# Patient Record
Sex: FEMALE | Race: WHITE | NOT HISPANIC OR LATINO | URBAN - METROPOLITAN AREA
[De-identification: names, ages, dates, MRNs, and addresses within clinical notes are randomized per-mention and may not be internally consistent; named-entity substitution may affect disease eponyms.]

---

## 2023-06-19 ENCOUNTER — HOSPITAL ENCOUNTER (OUTPATIENT)
Dept: DATA CONVERSION | Facility: HOSPITAL | Age: 68
End: 2023-06-19
Attending: SURGERY | Admitting: SURGERY

## 2023-06-19 DIAGNOSIS — Z88.0 ALLERGY STATUS TO PENICILLIN: ICD-10-CM

## 2023-06-19 DIAGNOSIS — Z87.891 PERSONAL HISTORY OF NICOTINE DEPENDENCE: ICD-10-CM

## 2023-06-19 DIAGNOSIS — E03.9 HYPOTHYROIDISM, UNSPECIFIED: ICD-10-CM

## 2023-06-19 DIAGNOSIS — Z12.11 ENCOUNTER FOR SCREENING FOR MALIGNANT NEOPLASM OF COLON: ICD-10-CM

## 2023-06-19 DIAGNOSIS — I10 ESSENTIAL (PRIMARY) HYPERTENSION: ICD-10-CM

## 2023-06-19 DIAGNOSIS — K21.9 GASTRO-ESOPHAGEAL REFLUX DISEASE WITHOUT ESOPHAGITIS: ICD-10-CM

## 2023-06-19 DIAGNOSIS — E66.9 OBESITY, UNSPECIFIED: ICD-10-CM

## 2023-06-19 DIAGNOSIS — Z87.11 PERSONAL HISTORY OF PEPTIC ULCER DISEASE: ICD-10-CM

## 2023-06-19 DIAGNOSIS — R12 HEARTBURN: ICD-10-CM

## 2023-06-19 DIAGNOSIS — Z85.048 PERSONAL HISTORY OF OTHER MALIGNANT NEOPLASM OF RECTUM, RECTOSIGMOID JUNCTION, AND ANUS: ICD-10-CM

## 2023-06-19 DIAGNOSIS — K58.9 IRRITABLE BOWEL SYNDROME WITHOUT DIARRHEA: ICD-10-CM

## 2023-09-07 VITALS — WEIGHT: 174.16 LBS | HEIGHT: 62 IN | BODY MASS INDEX: 32.05 KG/M2

## 2024-01-16 ENCOUNTER — APPOINTMENT (OUTPATIENT)
Dept: ENDOCRINOLOGY | Facility: CLINIC | Age: 69
End: 2024-01-16
Payer: MEDICARE

## 2024-08-14 ENCOUNTER — APPOINTMENT (OUTPATIENT)
Dept: UROLOGY | Facility: CLINIC | Age: 69
End: 2024-08-14
Payer: MEDICARE

## 2024-08-14 VITALS
HEART RATE: 64 BPM | DIASTOLIC BLOOD PRESSURE: 80 MMHG | HEIGHT: 61 IN | BODY MASS INDEX: 28.51 KG/M2 | WEIGHT: 151 LBS | SYSTOLIC BLOOD PRESSURE: 179 MMHG

## 2024-08-14 DIAGNOSIS — N32.81 OAB (OVERACTIVE BLADDER): ICD-10-CM

## 2024-08-14 DIAGNOSIS — N39.46 MIXED STRESS AND URGE URINARY INCONTINENCE: Primary | ICD-10-CM

## 2024-08-14 LAB
POC BILIRUBIN, URINE: NEGATIVE
POC BLOOD, URINE: ABNORMAL
POC GLUCOSE, URINE: NEGATIVE MG/DL
POC KETONES, URINE: NEGATIVE MG/DL
POC LEUKOCYTES, URINE: NEGATIVE
POC NITRITE,URINE: NEGATIVE
POC PH, URINE: 8 PH
POC PROTEIN, URINE: NEGATIVE MG/DL
POC SPECIFIC GRAVITY, URINE: 1.02
POC UROBILINOGEN, URINE: 0.2 EU/DL

## 2024-08-14 PROCEDURE — 3008F BODY MASS INDEX DOCD: CPT

## 2024-08-14 PROCEDURE — 1159F MED LIST DOCD IN RCRD: CPT

## 2024-08-14 PROCEDURE — 81003 URINALYSIS AUTO W/O SCOPE: CPT

## 2024-08-14 PROCEDURE — 99204 OFFICE O/P NEW MOD 45 MIN: CPT

## 2024-08-14 PROCEDURE — 1160F RVW MEDS BY RX/DR IN RCRD: CPT

## 2024-08-14 PROCEDURE — 51798 US URINE CAPACITY MEASURE: CPT

## 2024-08-14 PROCEDURE — 2000F BLOOD PRESSURE MEASURE: CPT

## 2024-08-14 RX ORDER — SOLIFENACIN SUCCINATE 10 MG/1
10 TABLET, FILM COATED ORAL DAILY
Qty: 30 TABLET | Refills: 3 | Status: SHIPPED | OUTPATIENT
Start: 2024-08-14 | End: 2024-12-12

## 2024-08-14 RX ORDER — ESCITALOPRAM OXALATE 10 MG/1
1 TABLET ORAL
COMMUNITY
Start: 2024-07-20

## 2024-08-14 RX ORDER — LEVOTHYROXINE SODIUM 137 UG/1
TABLET ORAL
COMMUNITY

## 2024-08-14 NOTE — PROGRESS NOTES
[facility]  Outpatient Clinic Note    Patient: Nemo Small  Age/Sex: 68 y.o., female  MRN: [id number]  Referred by: Dr. Lord ref. provider found     Chief Complaint: Urinary incontinence         History of Present Illness  This is a 68 y.o. female,  who presents as a new patient to the clinic for mixed urinary incontinence.  The patient reports that urge urinary incontinence is more bothersome than stress urinary incontinence.. She goes to the bathroom every 15 minutes during the day. She was on Vesicare 10 years ago.  She stated that this helped her symptoms significantly.  She is not sure why she stopped it. She goes to the bathroom 3-4 times a night. She denies dysuria, gross hematuria, flank pain, pelvic pain, vaginal bulging, fever or chills. The patient stated her bowel movements are normal and daily.  She is not sexually active, by choice. Previous abdominal/pelvic surgery a hysterectomy for for endometriosis.  The patient had 2 C-sections.  No history of breast cancer. The patient is a former cigarette smoker for 30 years.  The patient denies a history of glaucoma.        Gyn History:  - Menopausal: No           Postmenopausal bleeding: No  - Hysterectomy: Yes   - Sexually active:  No  Dyspareunia: No   Other issues: none  - Number of prior vaginal deliveries: 0  - Number of prior c-sections: 2      Past Medical & Surgical History  Past Medical History:   Diagnosis Date    Anxiety     Colon cancer (Multi)     Hypertension     Thyroid disease      Past Surgical History:   Procedure Laterality Date    BACK SURGERY       SECTION, CLASSIC      HYSTERECTOMY         Family History  No family history on file.    Social History  She reports that she has quit smoking. Her smoking use included cigarettes. She does not have any smokeless tobacco history on file. She reports that she does not currently use alcohol. She reports that she does not use drugs.    Allergies  Ampicillin and  Cefaclor    Medications:  Current Outpatient Medications on File Prior to Visit   Medication Sig Dispense Refill    escitalopram (Lexapro) 10 mg tablet Take 1 tablet (10 mg) by mouth early in the morning..      levothyroxine (Synthroid, Levoxyl) 137 mcg tablet TAKE 1 TABLET BY MOUTH MONDAY-SATURDAY BEFORE BREAKFAST      LISINOPRIL ORAL Take 40 mg by mouth once daily.       No current facility-administered medications on file prior to visit.      Vitals:    08/14/24 1336   BP: 179/80   Pulse: 64     Body mass index is 28.53 kg/m².    Review of Systems   A comprehensive 10+ review of systems was negative except for: see hpi          Physical Exam                                                                                                                      General: Well developed, well nourished, alert and cooperative, appears in no acute distress  Head: Normocephalic, atraumatic  Neck: supple, trachea midline  Eyes: Non-injected conjunctiva, sclera clear, no proptosis  Cardiac: Extremities are warm and well perfused. No edema, cyanosis or pallor.   Lungs: Breathing is easy, non-labored. Speaking in clear and complete sentences. Normal diaphragmatic movement.  Abdomen: soft, non-distended, non-tender, no rebound or guarding, no hernia and no CVA tenderness   MSK: Ambulatory with steady gait, unassisted  Neuro: alert and oriented to person, place and time  Psych: Demonstrates good judgement and reason, without hallucinations, abnormal affect or abnormal behaviors.  Skin: no obvious lesions, no rashes    PVR (by Ultrasound): 0mL   Urine dip:   Recent Results (from the past 6 hour(s))   POCT UA Automated manually resulted    Collection Time: 08/14/24  1:48 PM   Result Value Ref Range    POC Glucose, Urine NEGATIVE NEGATIVE mg/dl    POC Bilirubin, Urine NEGATIVE NEGATIVE    POC Ketones, Urine NEGATIVE NEGATIVE mg/dl    POC Specific Gravity, Urine 1.020 1.005 - 1.035    POC Blood, Urine TRACE-Intact (A) NEGATIVE     POC PH, Urine 8.0 No Reference Range Established PH    POC Protein, Urine NEGATIVE NEGATIVE, 30 (1+) mg/dl    POC Urobilinogen, Urine 0.2 0.2, 1.0 EU/DL    Poc Nitrite, Urine NEGATIVE NEGATIVE    POC Leukocytes, Urine NEGATIVE NEGATIVE       Labs  N/A    Imaging  N/A      IMPRESSION AND PLAN:  Nemo Small is a 68 y.o. presents with mixed urinary incontinence and overactive bladder    ABDULAZIZ: Urge dominant  -discussed mechanism of UUI and HANNAH, and treatment options for both including PFT, pessary, sling for HANNAH and PFT, pharmacotherapy and third-line therapy for OAB    OAB/UUI  -Start Vesicare once daily in the morning  we discussed botox vs sacral neuromodulation: both have similar efficacy 80% patients reports >50% improvement, botox associated with 5% risk of incomplete emptying, increase in UTI and will require re-injection in 6-9 months; and as early as 3 months. SNM is a staged procedure, 2 weeks apart, consisting first of lead implantation then internalization of IPG if there is improvement. Interstim is associated with lead migration, explantation, infection and bleeding, though risks are all <5%. We also discussed PTNS which is associated with success rates comparable to medical therapy but without side-effects without significant major morbidity.   -Educational handouts given to the patient on third line options    Follow up in 8 weeks.     All questions and concerns were answered and addressed.  The patient expressed understanding and agrees with the plan.     Reviewed and approved by TREVOR NGUYEN on 8/14/24 at 1:48 PM.

## 2024-08-15 ENCOUNTER — TELEPHONE (OUTPATIENT)
Dept: UROLOGY | Facility: CLINIC | Age: 69
End: 2024-08-15
Payer: MEDICARE

## 2024-08-15 NOTE — TELEPHONE ENCOUNTER
Patient picked up her script for Vesicare, she said you wanted to start her on 20mg, but they are only 10mg, do you want to change or have her take 2 per day  Thank you

## 2024-08-22 NOTE — TELEPHONE ENCOUNTER
Yes , I explained to the patient that you only wanted her to take 1 pill once daily at 10mg but she stated she is scared to go down dosage bc the 20mg is working. Do you mind reaching back out to her?

## 2024-08-22 NOTE — TELEPHONE ENCOUNTER
The patient should be taking one pill daily-10mg. I discussed with the patient the prescription I sent in was an increase in her previously dose from 5mg to 10mg. The maximum dose per day is 10mg.

## 2024-08-22 NOTE — TELEPHONE ENCOUNTER
Patient calling in stated she has been taking two pills once daily all last week. She thought you wanted her to take 20mg and she will run out by next week. - she said she was under the impression that she was to take 20mg instead of 10mg.

## 2024-10-15 ENCOUNTER — APPOINTMENT (OUTPATIENT)
Dept: UROLOGY | Facility: CLINIC | Age: 69
End: 2024-10-15
Payer: MEDICARE

## 2024-10-15 VITALS — BODY MASS INDEX: 29.27 KG/M2 | HEIGHT: 61 IN | WEIGHT: 155 LBS

## 2024-10-15 DIAGNOSIS — N39.46 MIXED STRESS AND URGE URINARY INCONTINENCE: Primary | ICD-10-CM

## 2024-10-15 DIAGNOSIS — N32.81 OAB (OVERACTIVE BLADDER): ICD-10-CM

## 2024-10-15 PROCEDURE — 99214 OFFICE O/P EST MOD 30 MIN: CPT

## 2024-10-15 PROCEDURE — 3008F BODY MASS INDEX DOCD: CPT

## 2024-10-15 PROCEDURE — 1159F MED LIST DOCD IN RCRD: CPT

## 2024-10-15 PROCEDURE — G2211 COMPLEX E/M VISIT ADD ON: HCPCS

## 2024-10-15 PROCEDURE — 1126F AMNT PAIN NOTED NONE PRSNT: CPT

## 2024-10-15 PROCEDURE — 1160F RVW MEDS BY RX/DR IN RCRD: CPT

## 2024-10-15 RX ORDER — MIRABEGRON 50 MG/1
50 TABLET, FILM COATED, EXTENDED RELEASE ORAL DAILY
Qty: 30 TABLET | Refills: 2 | Status: SHIPPED | OUTPATIENT
Start: 2024-10-15 | End: 2025-01-13

## 2024-10-15 ASSESSMENT — PAIN SCALES - GENERAL: PAINLEVEL: 0-NO PAIN

## 2024-10-15 NOTE — PROGRESS NOTES
[facility]  Outpatient Clinic Note    Patient: Nemo Small  Age/Sex: 69 y.o., female  MRN: [id number]    Chief Complaint:  8 week follow up          History of Present Illness  This is a 69 y.o. female, who presents for follow-up for medication management.  The patient was prescribed Vesicare 10 mg once daily the patient reports 50% better with the medication.  Her urinary urgency and frequency has decreased as well as her leakage.  The patient would like to try a different medication. The patient denies dysuria, gross hematuria, flank pain, pelvic pain, fever or chills.      Past Medical & Surgical History  Past Medical History:   Diagnosis Date    Anxiety     Colon cancer (Multi)     Hypertension     Thyroid disease      Past Surgical History:   Procedure Laterality Date    BACK SURGERY       SECTION, CLASSIC      HYSTERECTOMY         Family History  No family history on file.    Social History  She reports that she has quit smoking. Her smoking use included cigarettes. She does not have any smokeless tobacco history on file. She reports that she does not currently use alcohol. She reports that she does not use drugs.    Allergies  Ampicillin and Cefaclor    Medications:  Current Outpatient Medications on File Prior to Visit   Medication Sig Dispense Refill    escitalopram (Lexapro) 10 mg tablet Take 1 tablet (10 mg) by mouth early in the morning..      levothyroxine (Synthroid, Levoxyl) 137 mcg tablet TAKE 1 TABLET BY MOUTH MONDAY-SATURDAY BEFORE BREAKFAST      LISINOPRIL ORAL Take 40 mg by mouth once daily.      solifenacin (VESIcare) 10 mg tablet Take 1 tablet (10 mg) by mouth once daily. Swallow tablet whole; do not crush, chew, or split. 30 tablet 3     No current facility-administered medications on file prior to visit.        Review of Systems   A comprehensive 10+ review of systems was negative except for: see hpi          Physical Exam                                                                                                                       General: Well developed, well nourished, alert and cooperative, appears in no acute distress  Head: Normocephalic, atraumatic  Neck: supple, trachea midline  Eyes: Non-injected conjunctiva, sclera clear, no proptosis  Cardiac: Extremities are warm and well perfused. No edema, cyanosis or pallor.   Lungs: Breathing is easy, non-labored. Speaking in clear and complete sentences. Normal diaphragmatic movement.  Abdomen: soft, non-distended, non-tender, no rebound or guarding, no hernia and no CVA tenderness   MSK: Ambulatory with steady gait, unassisted  Neuro: alert and oriented to person, place and time  Psych: Demonstrates good judgement and reason, without hallucinations, abnormal affect or abnormal behaviors.  Skin: no obvious lesions, no rashes      Labs  N/A    Imaging  N/A    IMPRESSION AND PLAN:  This is a 69 y.o. female,  presents with mixed urinary incontinence and overactive bladder     ABDULAZIZ: Urge dominant  -discussed mechanism of UUI and HANNAH, and treatment options for both including PFT, pessary, sling for HANNAH and PFT, pharmacotherapy and third-line therapy for OAB     OAB/UUI  -Stop Vesicare  -Start Myrbetriq 50mg once daily  -we discussed botox vs sacral neuromodulation: both have similar efficacy 80% patients reports >50% improvement, botox associated with 5% risk of incomplete emptying, increase in UTI and will require re-injection in 6-9 months; and as early as 3 months. SNM is a staged procedure, 2 weeks apart, consisting first of lead implantation then internalization of IPG if there is improvement. Interstim is associated with lead migration, explantation, infection and bleeding, though risks are all <5%. We also discussed PTNS which is associated with success rates comparable to medical therapy but without side-effects without significant major morbidity.   -Educational handouts given to the patient on third line options     Follow up  in 6 weeks    All questions and concerns were answered and addressed.  The patient expressed understanding and agrees with the plan.     Reviewed and approved by TREVOR NGUYEN on 10/15/24 at 7:36 AM.

## 2024-11-19 NOTE — PROGRESS NOTES
[facility]  Outpatient Clinic Note    Patient: Nemo Small  Age/Sex: 69 y.o., female  MRN: [id number]  Virtual Visit: An interactive audio and video telecommunication system which permits real time communications between the patient (at the originating site) and provider (at the distant site) was utilized to provide this telehealth service. Verbal consent was requested and obtained from Nemo Small on this date 2024 for a telehealth visit.     Chief Complaint:  medication management         History of Present Illness  This is a 69 y.o. female, who presents for medication management. The patient started taking Myrbetriq and reports this medication has not improved her symptoms.  The patient would like to discuss bladder Botox.  The Patient previously failed Vesicare. The patient denies dysuria, gross hematuria, flank pain, pelvic pain, fever or chills.       Past Medical & Surgical History  Past Medical History:   Diagnosis Date    Anxiety     Colon cancer (Multi)     Hypertension     Thyroid disease      Past Surgical History:   Procedure Laterality Date    BACK SURGERY       SECTION, CLASSIC      HYSTERECTOMY         Family History  No family history on file.    Social History  She reports that she has quit smoking. Her smoking use included cigarettes. She does not have any smokeless tobacco history on file. She reports that she does not currently use alcohol. She reports that she does not use drugs.    Allergies  Ampicillin and Cefaclor    Medications:  Current Outpatient Medications on File Prior to Visit   Medication Sig Dispense Refill    escitalopram (Lexapro) 10 mg tablet Take 1 tablet (10 mg) by mouth early in the morning..      levothyroxine (Synthroid, Levoxyl) 137 mcg tablet TAKE 1 TABLET BY MOUTH MONDAY-SATURDAY BEFORE BREAKFAST      LISINOPRIL ORAL Take 40 mg by mouth once daily.      mirabegron (Myrbetriq) 50 mg tablet extended release 24 hr 24 hr tablet Take 1 tablet (50  mg) by mouth once daily. 30 tablet 2    solifenacin (VESIcare) 10 mg tablet Take 1 tablet (10 mg) by mouth once daily. Swallow tablet whole; do not crush, chew, or split. 30 tablet 3     No current facility-administered medications on file prior to visit.        Review of Systems   A comprehensive 10+ review of systems was negative except for: see hpi          Physical Exam                                                                                                                      General: Well developed, well nourished, alert and cooperative, appears in no acute distress  Eyes: no proptosis  Lungs: Breathing is easy, non-labored while speaking in clear and complete sentences.   Neuro: alert and oriented to person, place and time  Psych: Demonstrates good judgement and reason, without hallucinations, abnormal affect or abnormal behaviors.  Skin: no obvious lesions, no rashes      Labs  N/A    Imaging  N/A    IMPRESSION AND PLAN:  Nemo Small is a 69 y.o. presents with mixed urinary incontinence and overactive bladder.  Patient has a past medical history of colon cancer, she had radiation therapy. She is a previous patient of Dr. Wilder     ABDULAZIZ: Urge dominant  -discussed mechanism of UUI and HANNAH, and treatment options for both including PFT, pessary, sling for HANNAH and PFT, pharmacotherapy and third-line therapy for OAB     OAB/UUI  -Failed Vesicare and Myrbetriq  -we discussed botox vs sacral neuromodulation: both have similar efficacy 80% patients reports >50% improvement, botox associated with 5% risk of incomplete emptying, increase in UTI and will require re-injection in 6-9 months; and as early as 3 months. SNM is a staged procedure, 2 weeks apart, consisting first of lead implantation then internalization of IPG if there is improvement. Interstim is associated with lead migration, explantation, infection and bleeding, though risks are all <5%. We also discussed PTNS which is associated with success  rates comparable to medical therapy but without side-effects without significant major morbidity.   -Patient is interested in bladder botox with Dr. Mendez  -UDS ordered     [facility] will call the patient to schedule UDS and appointment with Dr. Mendez, message sent      All questions and concerns were answered and addressed.  The patient expressed understanding and agrees with the plan.     Reviewed and approved by TREVOR NGUYEN on 11/19/24 at 12:11 PM.

## 2024-11-20 ENCOUNTER — APPOINTMENT (OUTPATIENT)
Dept: UROLOGY | Facility: CLINIC | Age: 69
End: 2024-11-20
Payer: MEDICARE

## 2024-11-20 DIAGNOSIS — N32.81 OAB (OVERACTIVE BLADDER): ICD-10-CM

## 2024-11-20 DIAGNOSIS — N39.46 MIXED STRESS AND URGE URINARY INCONTINENCE: Primary | ICD-10-CM

## 2024-11-20 PROCEDURE — 1159F MED LIST DOCD IN RCRD: CPT

## 2024-11-20 PROCEDURE — 1160F RVW MEDS BY RX/DR IN RCRD: CPT

## 2024-11-20 PROCEDURE — 99213 OFFICE O/P EST LOW 20 MIN: CPT

## 2024-11-20 RX ORDER — SOLIFENACIN SUCCINATE 10 MG/1
10 TABLET, FILM COATED ORAL DAILY
Qty: 30 TABLET | Refills: 5 | Status: SHIPPED | OUTPATIENT
Start: 2024-11-20 | End: 2025-05-19

## 2024-11-26 ENCOUNTER — APPOINTMENT (OUTPATIENT)
Dept: UROLOGY | Facility: CLINIC | Age: 69
End: 2024-11-26
Payer: MEDICARE

## 2025-01-28 ENCOUNTER — APPOINTMENT (OUTPATIENT)
Facility: HOSPITAL | Age: 70
End: 2025-01-28
Payer: MEDICARE

## 2025-02-18 ENCOUNTER — OFFICE VISIT (OUTPATIENT)
Facility: HOSPITAL | Age: 70
End: 2025-02-18
Payer: MEDICARE

## 2025-02-18 VITALS — HEART RATE: 59 BPM | RESPIRATION RATE: 16 BRPM | SYSTOLIC BLOOD PRESSURE: 173 MMHG | DIASTOLIC BLOOD PRESSURE: 94 MMHG

## 2025-02-18 DIAGNOSIS — N32.81 OAB (OVERACTIVE BLADDER): ICD-10-CM

## 2025-02-18 DIAGNOSIS — N39.3 SUI (STRESS URINARY INCONTINENCE, FEMALE): Primary | ICD-10-CM

## 2025-02-18 PROCEDURE — 99204 OFFICE O/P NEW MOD 45 MIN: CPT | Performed by: STUDENT IN AN ORGANIZED HEALTH CARE EDUCATION/TRAINING PROGRAM

## 2025-02-18 PROCEDURE — 99214 OFFICE O/P EST MOD 30 MIN: CPT | Performed by: STUDENT IN AN ORGANIZED HEALTH CARE EDUCATION/TRAINING PROGRAM

## 2025-02-18 PROCEDURE — 1159F MED LIST DOCD IN RCRD: CPT | Performed by: STUDENT IN AN ORGANIZED HEALTH CARE EDUCATION/TRAINING PROGRAM

## 2025-02-18 ASSESSMENT — COLUMBIA-SUICIDE SEVERITY RATING SCALE - C-SSRS
1. IN THE PAST MONTH, HAVE YOU WISHED YOU WERE DEAD OR WISHED YOU COULD GO TO SLEEP AND NOT WAKE UP?: NO
2. HAVE YOU ACTUALLY HAD ANY THOUGHTS OF KILLING YOURSELF?: NO
6. HAVE YOU EVER DONE ANYTHING, STARTED TO DO ANYTHING, OR PREPARED TO DO ANYTHING TO END YOUR LIFE?: NO

## 2025-02-18 NOTE — PROGRESS NOTES
"HISTORY OF PRESENT ILLNESS:  Nemo Small is a 69 y.o. female who presents today for a follow up visit. She was seeing Irena Wall PA-C for mixed urinary incontinence. Her 2 children were born by . She denies any feelings for prolapse. She reports that her bowel movements are okay. She has a history of colon cancer and radiation. She does have stress and urge incontinence.          Past Medical History  She has a past medical history of Anxiety, Colon cancer (Multi), Hypertension, and Thyroid disease.    Surgical History  She has a past surgical history that includes  section, classic; Back surgery; and Hysterectomy.     Social History  She reports that she has quit smoking. Her smoking use included cigarettes. She does not have any smokeless tobacco history on file. She reports that she does not currently use alcohol. She reports that she does not use drugs.    Family History  No family history on file.     Allergies  Ampicillin and Cefaclor      A comprehensive 10+ review of systems was negative except for: see hpi                          PHYSICAL EXAMINATION:  BP Readings from Last 3 Encounters:   24 179/80      Wt Readings from Last 3 Encounters:   10/15/24 70.3 kg (155 lb)   24 68.5 kg (151 lb)      BMI: Estimated body mass index is 29.29 kg/m² as calculated from the following:    Height as of 10/15/24: 1.549 m (5' 1\").    Weight as of 10/15/24: 70.3 kg (155 lb).  BSA: Estimated body surface area is 1.74 meters squared as calculated from the following:    Height as of 10/15/24: 1.549 m (5' 1\").    Weight as of 10/15/24: 70.3 kg (155 lb).  HEENT: Normocephalic, atraumatic, PER EOMI, nonicteric, trachea normal, thyroid normal, oropharynx normal.  CARDIAC: regular rate & rhythm, S1 & S2 normal.  No heaves, thrills, gallops or murmurs.  LUNGS: Clear to auscultation, no spinal or CV tenderness.  EXTREMITIES: No evidence of cyanosis, clubbing or edema.       Stage 0 POP "         Assessment:  69 y.o. presents with ABDULAZIZ.  Patient has a past medical history of colon cancer, she had radiation therapy. She is a previous patient of Dr. Wilder     ABDULAZIZ: stress dominant   -discussed mechanism of UUI and HANNAH, and treatment options for both including PFT, pessary, sling for HANNAH and PFT, pharmacotherapy and third-line therapy for OAB  -Failed Vesicare and Myrbetriq  -UDS ordered  -Informational handouts provided     HANNAH:  We discussed the sling procedure in depth with her there is a long-term success rate of 70-80% complete continence and up to 90% significant improvement up to 10 years after surgery, though the sling is meant to last a lifetime, there is a <5% risk of subsequent surgery, either revision or excision within 9 years. The major complications include bladder perforation with sling placement <1%, retention requiring sling lysis 1-3%, transient retention requiring 2-3 days of catheter drainage 33%, and mesh erosion 1-3%.     Bulkamid is associated with slightly less success rate of a sling about 60 to 70% of women having >90% improvement. However, there seems to be similar long-term success compared to sling with fewer side-effects. Main AE is urinary retention which resolves within 24 hours of using a 10-12 Mal[place] catheter.  I discussed that if she still has some leakage after her procedure, she could perform another injection within 4 weeks and this procedure being performed in the office.     UUI:  we discussed botox vs sacral neuromodulation: both have similar efficacy 80% patients reports >50% improvement, botox associated with 5% risk of incomplete emptying, increase in UTI and will require re-injection in 6-9 months; and as early as 3 months. SNM is a staged procedure, 2 weeks apart, consisting first of lead implantation then internalization of IPG if there is improvement. Interstim is associated with lead migration, explantation, infection and bleeding, though risks are all  <5%. We also discussed PTNS which is associated with success rates comparable to medical therapy but without side-effects without significant major morbidity.       Follow up after UDS       All questions and concerns were answered and addressed.  The patient expressed understanding and agrees with the plan.     Haseeb Mendez MD    Scribe Attestation  By signing my name below, I, Nara Currie, Scribe   attest that this documentation has been prepared under the direction and in the presence of Haseeb Mendez MD.

## 2025-03-17 ENCOUNTER — APPOINTMENT (OUTPATIENT)
Dept: UROLOGY | Facility: CLINIC | Age: 70
End: 2025-03-17
Payer: MEDICARE

## 2025-03-17 DIAGNOSIS — N39.46 URINARY INCONTINENCE, MIXED: ICD-10-CM

## 2025-03-17 PROCEDURE — 51784 ANAL/URINARY MUSCLE STUDY: CPT | Performed by: UROLOGY

## 2025-03-17 PROCEDURE — 51728 CYSTOMETROGRAM W/VP: CPT | Performed by: UROLOGY

## 2025-03-17 PROCEDURE — 51741 ELECTRO-UROFLOWMETRY FIRST: CPT | Performed by: UROLOGY

## 2025-03-17 PROCEDURE — 51797 INTRAABDOMINAL PRESSURE TEST: CPT | Performed by: UROLOGY

## 2025-03-17 RX ORDER — ALPRAZOLAM 0.5 MG/1
TABLET ORAL
COMMUNITY
Start: 2023-08-22

## 2025-03-17 NOTE — PROGRESS NOTES
Nemo Small 69 y.o. female  Procedures:  Patient referred by Dr. Mendez for urodynamics to evaluate mixed incontinence. Dr. Schofield was present in office at time of study. Pre-uroflow was completed today with pvr of 75 ml. Urine was clear for uti today. Patient currently on Solifenacin. Patient did leak on CLPP. Pvr after study was 190 ml.  Patient instructed to increase fluids if she has any burning or blood in urine. Patient made aware she may have blood vaginally due to abdominal catheter insertion.  Patient understood and consented to urodynamics. Patient will follow up with Dr. Mendez to review results. 03/17/2025/boby

## 2025-05-02 ENCOUNTER — OFFICE VISIT (OUTPATIENT)
Facility: HOSPITAL | Age: 70
End: 2025-05-02
Payer: MEDICARE

## 2025-05-02 DIAGNOSIS — N39.3 SUI (STRESS URINARY INCONTINENCE, FEMALE): ICD-10-CM

## 2025-05-02 DIAGNOSIS — N32.81 OAB (OVERACTIVE BLADDER): Primary | ICD-10-CM

## 2025-05-02 PROCEDURE — 51784 ANAL/URINARY MUSCLE STUDY: CPT | Performed by: STUDENT IN AN ORGANIZED HEALTH CARE EDUCATION/TRAINING PROGRAM

## 2025-05-02 PROCEDURE — 51741 ELECTRO-UROFLOWMETRY FIRST: CPT | Performed by: STUDENT IN AN ORGANIZED HEALTH CARE EDUCATION/TRAINING PROGRAM

## 2025-05-02 PROCEDURE — 51797 INTRAABDOMINAL PRESSURE TEST: CPT | Performed by: STUDENT IN AN ORGANIZED HEALTH CARE EDUCATION/TRAINING PROGRAM

## 2025-05-02 PROCEDURE — 99214 OFFICE O/P EST MOD 30 MIN: CPT | Mod: 57 | Performed by: STUDENT IN AN ORGANIZED HEALTH CARE EDUCATION/TRAINING PROGRAM

## 2025-05-02 PROCEDURE — 99214 OFFICE O/P EST MOD 30 MIN: CPT | Performed by: STUDENT IN AN ORGANIZED HEALTH CARE EDUCATION/TRAINING PROGRAM

## 2025-05-02 PROCEDURE — 1159F MED LIST DOCD IN RCRD: CPT | Performed by: STUDENT IN AN ORGANIZED HEALTH CARE EDUCATION/TRAINING PROGRAM

## 2025-05-02 PROCEDURE — 51729 CYSTOMETROGRAM W/VP&UP: CPT | Performed by: STUDENT IN AN ORGANIZED HEALTH CARE EDUCATION/TRAINING PROGRAM

## 2025-05-02 ASSESSMENT — PATIENT HEALTH QUESTIONNAIRE - PHQ9
SUM OF ALL RESPONSES TO PHQ9 QUESTIONS 1 AND 2: 0
2. FEELING DOWN, DEPRESSED OR HOPELESS: NOT AT ALL
1. LITTLE INTEREST OR PLEASURE IN DOING THINGS: NOT AT ALL

## 2025-05-02 ASSESSMENT — COLUMBIA-SUICIDE SEVERITY RATING SCALE - C-SSRS
2. HAVE YOU ACTUALLY HAD ANY THOUGHTS OF KILLING YOURSELF?: NO
1. IN THE PAST MONTH, HAVE YOU WISHED YOU WERE DEAD OR WISHED YOU COULD GO TO SLEEP AND NOT WAKE UP?: NO
6. HAVE YOU EVER DONE ANYTHING, STARTED TO DO ANYTHING, OR PREPARED TO DO ANYTHING TO END YOUR LIFE?: NO

## 2025-05-02 ASSESSMENT — ENCOUNTER SYMPTOMS
OCCASIONAL FEELINGS OF UNSTEADINESS: 0
LOSS OF SENSATION IN FEET: 0
DEPRESSION: 0

## 2025-05-02 NOTE — PROGRESS NOTES
"HISTORY OF PRESENT ILLNESS:  Nemo Small is a 69 y.o. female who presents today for a follow up visit.   UDS demonstrates HANNAH           Past Medical History  She has a past medical history of Anxiety, Colon cancer (Multi), Hypertension, and Thyroid disease.    Surgical History  She has a past surgical history that includes  section, classic; Back surgery; and Hysterectomy.     Social History  She reports that she has quit smoking. Her smoking use included cigarettes. She does not have any smokeless tobacco history on file. She reports that she does not currently use alcohol. She reports that she does not use drugs.    Family History  Family History[1]     Allergies  Ampicillin and Cefaclor      A comprehensive 10+ review of systems was negative except for: see hpi                          PHYSICAL EXAMINATION:  BP Readings from Last 3 Encounters:   25 (!) 173/94   24 179/80      Wt Readings from Last 3 Encounters:   10/15/24 70.3 kg (155 lb)   24 68.5 kg (151 lb)      BMI: Estimated body mass index is 29.29 kg/m² as calculated from the following:    Height as of 10/15/24: 1.549 m (5' 1\").    Weight as of 10/15/24: 70.3 kg (155 lb).  BSA: Estimated body surface area is 1.74 meters squared as calculated from the following:    Height as of 10/15/24: 1.549 m (5' 1\").    Weight as of 10/15/24: 70.3 kg (155 lb).  HEENT: Normocephalic, atraumatic, PER EOMI, nonicteric, trachea normal, thyroid normal, oropharynx normal.  CARDIAC: regular rate & rhythm, S1 & S2 normal.  No heaves, thrills, gallops or murmurs.  LUNGS: Clear to auscultation, no spinal or CV tenderness.  EXTREMITIES: No evidence of cyanosis, clubbing or edema.               Assessment:  69 y.o. presents with ABDULAZIZ.  Patient has a past medical history of colon cancer, she had radiation therapy. She is a previous patient of Dr. Wilder     ABDULAZIZ: stress dominant     -Failed Vesicare and Myrbetriq  -UDS shows HANNAH, normal " emptying  -Discussed botox, SNM, bulking, and sling with her in detail again today   -Will schedule for botox and see how she does   -will schedule for sling later in year    Follow up for botox        All questions and concerns were answered and addressed.  The patient expressed understanding and agrees with the plan.     Haseeb Mendez MD    Scribe Attestation  By signing my name below, I, Naramarizol Currie, Scribcatina   attest that this documentation has been prepared under the direction and in the presence of Haseeb Mendez MD.         [1] No family history on file.

## 2025-05-05 RX ORDER — CEFAZOLIN SODIUM 2 G/50ML
2 SOLUTION INTRAVENOUS ONCE
OUTPATIENT
Start: 2025-05-05 | End: 2025-05-05

## 2025-06-24 ENCOUNTER — APPOINTMENT (OUTPATIENT)
Dept: UROLOGY | Facility: CLINIC | Age: 70
End: 2025-06-24
Payer: MEDICARE

## 2025-07-06 NOTE — PROGRESS NOTES
"HISTORY OF PRESENT ILLNESS:  Nemo  is a 69 y.o. female who presents today for a botox injection.          Past Medical History  She has a past medical history of Anxiety, Colon cancer (Multi), Hypertension, and Thyroid disease.    Surgical History  She has a past surgical history that includes  section, classic; Back surgery; and Hysterectomy.     Social History  She reports that she has quit smoking. Her smoking use included cigarettes. She does not have any smokeless tobacco history on file. She reports that she does not currently use alcohol. She reports that she does not use drugs.    Family History  Family History[1]     Allergies  Ampicillin and Cefaclor      A comprehensive 10+ review of systems was negative except for: see hpi                          PHYSICAL EXAMINATION:  BP Readings from Last 3 Encounters:   25 (!) 173/94   24 179/80      Wt Readings from Last 3 Encounters:   10/15/24 70.3 kg (155 lb)   24 68.5 kg (151 lb)      BMI:   Estimated body mass index is 29.29 kg/m² as calculated from the following:    Height as of 10/15/24: 1.549 m (5' 1\").    Weight as of 10/15/24: 70.3 kg (155 lb).  BSA:   Estimated body surface area is 1.74 meters squared as calculated from the following:    Height as of 10/15/24: 1.549 m (5' 1\").    Weight as of 10/15/24: 70.3 kg (155 lb).  HEENT: Normocephalic, atraumatic, PER EOMI, nonicteric, trachea normal, thyroid normal, oropharynx normal.  CARDIAC: regular rate & rhythm, S1 & S2 normal.  No heaves, thrills, gallops or murmurs.  LUNGS: Clear to auscultation, no spinal or CV tenderness.  EXTREMITIES: No evidence of cyanosis, clubbing or edema.       Botox Injection    Nemo came today for Botox injection.  I reviewed with her the risks and benefits including ptosis, infection, and bleeding. She has no contraindications. She is on no antibiotics and has no neuromuscular disorders.  Pregnancy is not an issue.     She tolerated the procedure " well.  The patient  will return to see me again in three to four months, earlier if there are any problems.     Nemo understands the side effects and risks, as well as the necessity for continued treatment to maintain improvement.  Additional therapy may be necessary. Call if there are any problems. See diagram for injection sites and dosages. 100 units were used at a concentration of 10 units per 0.1 mL.         Assessment:  69 y.o.  with ABDULAZIZ.  Patient has a past medical history of colon cancer, she had radiation therapy. She is a previous patient of Dr. Tina CINTRON: stress dominant     -Failed Vesicare and Myrbetriq  -UDS shows HANNAH, normal emptying  -Discussed botox, SNM, bulking, and sling with her in detail again today   -will schedule for sling later in year  -S/P botox, 100 units, 7/8/25  -Rx abx       Follow up in 6 to 8 weeks      All questions and concerns were answered and addressed.  The patient expressed understanding and agrees with the plan.     Haseeb Mendez MD    Scribe Attestation  By signing my name below, I, Nara Currie, Scribe   attest that this documentation has been prepared under the direction and in the presence of Haseeb Mendez MD.         [1] No family history on file.

## 2025-07-08 ENCOUNTER — APPOINTMENT (OUTPATIENT)
Dept: UROLOGY | Facility: CLINIC | Age: 70
End: 2025-07-08
Payer: MEDICARE

## 2025-07-08 DIAGNOSIS — R39.9 URINARY SYMPTOM OR SIGN: ICD-10-CM

## 2025-07-08 DIAGNOSIS — N32.81 OAB (OVERACTIVE BLADDER): ICD-10-CM

## 2025-07-08 LAB
POC APPEARANCE, URINE: CLEAR
POC BILIRUBIN, URINE: NEGATIVE
POC BLOOD, URINE: ABNORMAL
POC COLOR, URINE: YELLOW
POC GLUCOSE, URINE: NEGATIVE MG/DL
POC KETONES, URINE: NEGATIVE MG/DL
POC LEUKOCYTES, URINE: NEGATIVE
POC NITRITE,URINE: NEGATIVE
POC PH, URINE: 6.5 PH
POC PROTEIN, URINE: NEGATIVE MG/DL
POC SPECIFIC GRAVITY, URINE: 1.01
POC UROBILINOGEN, URINE: 0.2 EU/DL

## 2025-07-08 PROCEDURE — 81003 URINALYSIS AUTO W/O SCOPE: CPT | Performed by: STUDENT IN AN ORGANIZED HEALTH CARE EDUCATION/TRAINING PROGRAM

## 2025-07-08 PROCEDURE — 52287 CYSTOSCOPY CHEMODENERVATION: CPT | Performed by: STUDENT IN AN ORGANIZED HEALTH CARE EDUCATION/TRAINING PROGRAM

## 2025-07-08 RX ORDER — LIDOCAINE HYDROCHLORIDE 10 MG/ML
50 INJECTION, SOLUTION INFILTRATION; PERINEURAL ONCE
Status: COMPLETED | OUTPATIENT
Start: 2025-07-08 | End: 2025-07-08

## 2025-07-08 RX ORDER — NITROFURANTOIN 25; 75 MG/1; MG/1
100 CAPSULE ORAL 2 TIMES DAILY
Qty: 6 CAPSULE | Refills: 0 | Status: SHIPPED | OUTPATIENT
Start: 2025-07-08 | End: 2025-07-11

## 2025-07-08 RX ORDER — INDOMETHACIN 25 MG/1
10 CAPSULE ORAL ONCE
Status: COMPLETED | OUTPATIENT
Start: 2025-07-08 | End: 2025-07-08

## 2025-07-08 RX ADMIN — LIDOCAINE HYDROCHLORIDE 50 ML: 10 INJECTION, SOLUTION INFILTRATION; PERINEURAL at 14:54

## 2025-07-08 RX ADMIN — INDOMETHACIN 10 MEQ: 25 CAPSULE ORAL at 14:54

## 2025-08-26 ENCOUNTER — APPOINTMENT (OUTPATIENT)
Dept: UROLOGY | Facility: CLINIC | Age: 70
End: 2025-08-26
Payer: MEDICARE